# Patient Record
Sex: FEMALE | Race: BLACK OR AFRICAN AMERICAN | NOT HISPANIC OR LATINO
[De-identification: names, ages, dates, MRNs, and addresses within clinical notes are randomized per-mention and may not be internally consistent; named-entity substitution may affect disease eponyms.]

---

## 2019-03-05 ENCOUNTER — TRANSCRIPTION ENCOUNTER (OUTPATIENT)
Age: 24
End: 2019-03-05

## 2019-07-01 PROBLEM — Z00.00 ENCOUNTER FOR PREVENTIVE HEALTH EXAMINATION: Status: ACTIVE | Noted: 2019-07-01

## 2019-08-01 ENCOUNTER — APPOINTMENT (OUTPATIENT)
Dept: GASTROENTEROLOGY | Facility: CLINIC | Age: 24
End: 2019-08-01

## 2020-06-02 ENCOUNTER — EMERGENCY (EMERGENCY)
Facility: HOSPITAL | Age: 25
LOS: 1 days | Discharge: HOME | End: 2020-06-02
Attending: EMERGENCY MEDICINE
Payer: SELF-PAY

## 2020-06-02 VITALS
WEIGHT: 126.99 LBS | DIASTOLIC BLOOD PRESSURE: 66 MMHG | OXYGEN SATURATION: 97 % | TEMPERATURE: 99 F | RESPIRATION RATE: 20 BRPM | SYSTOLIC BLOOD PRESSURE: 95 MMHG | HEART RATE: 69 BPM | HEIGHT: 66 IN

## 2020-06-02 VITALS — DIASTOLIC BLOOD PRESSURE: 68 MMHG | SYSTOLIC BLOOD PRESSURE: 108 MMHG | HEART RATE: 72 BPM

## 2020-06-02 LAB — HCG UR QL: POSITIVE

## 2020-06-02 PROCEDURE — 72125 CT NECK SPINE W/O DYE: CPT | Mod: 26

## 2020-06-02 PROCEDURE — 99284 EMERGENCY DEPT VISIT MOD MDM: CPT

## 2020-06-02 PROCEDURE — 72125 CT NECK SPINE W/O DYE: CPT

## 2020-06-02 PROCEDURE — 81025 URINE PREGNANCY TEST: CPT

## 2020-06-02 RX ORDER — ACETAMINOPHEN 500 MG
650 TABLET ORAL ONCE
Refills: 0 | Status: COMPLETED | OUTPATIENT
Start: 2020-06-02 | End: 2020-06-02

## 2020-06-02 RX ADMIN — Medication 650 MILLIGRAM(S): at 15:44
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
## 2020-06-02 NOTE — ED STATDOCS - PATIENT PORTAL LINK FT
You can access the FollowMyHealth Patient Portal offered by [facility] by registering at the following website: [email or web address]. By joining [employer]’s FollowMyHealth portal, you will also be able to view your health information using other applications (apps) compatible with our system.
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
## 2020-06-02 NOTE — ED STATDOCS - NSFOLLOWUPCLINICS_GEN_ALL_ED_FT
[id number] [place] [address]  Phone: [phone]  Fax:   Follow Up Time: 1-3 Days
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
## 2020-06-02 NOTE — ED STATDOCS - PHYSICAL EXAMINATION
MSK: +C3 TTP, +  paraspinal neck and back TTP, +diffuse muscle aches, Full ROM all extremities, no bony TTP
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
## 2022-01-24 ENCOUNTER — APPOINTMENT (OUTPATIENT)
Dept: ANTEPARTUM | Facility: CLINIC | Age: 27
End: 2022-01-24

## 2022-01-31 ENCOUNTER — APPOINTMENT (OUTPATIENT)
Dept: ANTEPARTUM | Facility: CLINIC | Age: 27
End: 2022-01-31

## 2022-02-07 ENCOUNTER — APPOINTMENT (OUTPATIENT)
Dept: ANTEPARTUM | Facility: CLINIC | Age: 27
End: 2022-02-07
Payer: MEDICAID

## 2022-02-07 ENCOUNTER — ASOB RESULT (OUTPATIENT)
Age: 27
End: 2022-02-07

## 2022-02-07 PROCEDURE — 76801 OB US < 14 WKS SINGLE FETUS: CPT

## 2022-02-07 PROCEDURE — 76813 OB US NUCHAL MEAS 1 GEST: CPT

## 2022-04-13 ENCOUNTER — APPOINTMENT (OUTPATIENT)
Dept: ANTEPARTUM | Facility: CLINIC | Age: 27
End: 2022-04-13
Payer: MEDICAID

## 2022-04-13 ENCOUNTER — NON-APPOINTMENT (OUTPATIENT)
Age: 27
End: 2022-04-13

## 2022-04-13 ENCOUNTER — ASOB RESULT (OUTPATIENT)
Age: 27
End: 2022-04-13

## 2022-04-13 PROCEDURE — 76811 OB US DETAILED SNGL FETUS: CPT

## 2022-04-13 PROCEDURE — 76817 TRANSVAGINAL US OBSTETRIC: CPT

## 2022-06-22 ENCOUNTER — APPOINTMENT (OUTPATIENT)
Dept: ANTEPARTUM | Facility: CLINIC | Age: 27
End: 2022-06-22
Payer: COMMERCIAL

## 2022-06-22 ENCOUNTER — ASOB RESULT (OUTPATIENT)
Age: 27
End: 2022-06-22

## 2022-06-22 PROCEDURE — 76816 OB US FOLLOW-UP PER FETUS: CPT

## 2022-06-22 PROCEDURE — 76819 FETAL BIOPHYS PROFIL W/O NST: CPT

## 2022-07-06 ENCOUNTER — OUTPATIENT (OUTPATIENT)
Dept: INPATIENT UNIT | Facility: HOSPITAL | Age: 27
LOS: 1 days | End: 2022-07-06
Payer: SELF-PAY

## 2022-07-06 VITALS — HEART RATE: 95 BPM | DIASTOLIC BLOOD PRESSURE: 74 MMHG | SYSTOLIC BLOOD PRESSURE: 120 MMHG

## 2022-07-06 VITALS — DIASTOLIC BLOOD PRESSURE: 72 MMHG | HEART RATE: 85 BPM | SYSTOLIC BLOOD PRESSURE: 110 MMHG

## 2022-07-06 DIAGNOSIS — O47.03 FALSE LABOR BEFORE 37 COMPLETED WEEKS OF GESTATION, THIRD TRIMESTER: ICD-10-CM

## 2022-07-06 PROCEDURE — 59025 FETAL NON-STRESS TEST: CPT

## 2022-07-06 PROCEDURE — G0463: CPT

## 2022-07-06 NOTE — OB PROVIDER TRIAGE NOTE - HISTORY OF PRESENT ILLNESS
PADMINI JAIMES is a 27y  at 34w1d GA who presents to L&D for rule out ROM. She describes leaking of runny, clear, non-odorous fluid that started 3 days ago. It comes and goes but lasts throughout the day. At her most recent office visit, she described these symptoms and was tested for Candida/BV, but was negative. She denies vaginal bleeding, contractions. She endorses increasing pelvic pressure over the last 2 weeks. She endorses good fetal movement.   She denies headaches, fevers, chills, nausea, vomiting, dysuria.    Pregnancy course is uncomplicated.     POB: G1 (2018) medical TOP  PGYN: -fibroids/+cystic ovaries, denied STD hx, denies abnormal PAPs  PMH: Denies  PSH: R breast biopsy resulted benign  SH: Denies tobacco use, EtOH use and illicit drug use during the pregnancy; Feels safe at home  Meds: PNV  All: NKDA       PADMINI JAIMES is a 27y  at 34w1d GA who presents to L&D for rule out ROM. She describes leaking of runny, clear, non-odorous fluid that started 3 days ago. It comes and goes but lasts throughout the day. At her most recent office visit, she described these symptoms and was tested for Candida/BV, but was negative. She last had intercourse yesterday. She denies vaginal bleeding, contractions. She endorses increasing pelvic pressure over the last 2 weeks. She endorses good fetal movement.   She denies headaches, fevers, chills, nausea, vomiting, dysuria.    Pregnancy course is uncomplicated.     POB: G1 (2018) medical TOP  PGYN: -fibroids/+cystic ovaries, denied STD hx, denies abnormal PAPs  PMH: Denies  PSH: R breast biopsy resulted benign  SH: Denies tobacco use, EtOH use and illicit drug use during the pregnancy; Feels safe at home  Meds: PNV  All: NKDA

## 2022-07-06 NOTE — OB PROVIDER TRIAGE NOTE - NSHPPHYSICALEXAM_GEN_ALL_CORE
HR: 95 (07-06-22 @ 21:10) (95 - 95)  BP: 120/74 (07-06-22 @ 21:10) (120/74 - 120/74)    Gen: NAD, well-appearing  Heart: RRR  Lungs: CTAB  Abd: soft, gravid    SVE:   SSE: cervix visualized, closed and without any signs of bleeding or drainage, no pooling   FHT: 120/mod/+accels/-decels  [place]: none    Discussed with  HR: 95 (07-06-22 @ 21:10) (95 - 95)  BP: 120/74 (07-06-22 @ 21:10) (120/74 - 120/74)    Gen: NAD, well-appearing  Heart: RRR  Lungs: CTAB  Abd: soft, gravid    SVE: @  SSE: cervix visualized, closed and without any signs of bleeding or drainage, no pooling   FHT: 120/mod/+accels/-decels  [place]: none    Discussed with  HR: 95 (07-06-22 @ 21:10) (95 - 95)  BP: 120/74 (07-06-22 @ 21:10) (120/74 - 120/74)    Gen: NAD, well-appearing  Heart: RRR  Lungs: CTAB  Abd: soft, gravid    SVE: 0/0  SSE: cervix visualized, closed and without any signs of bleeding or drainage, no pooling of fluid, moderate leukorrhea  FHT: 120/mod/+accels/-decels  [place]: none  Bedside U/S: vtx, anterior. RUBEN 17.9

## 2022-08-08 ENCOUNTER — OUTPATIENT (OUTPATIENT)
Dept: INPATIENT UNIT | Facility: HOSPITAL | Age: 27
LOS: 1 days | End: 2022-08-08
Payer: SELF-PAY

## 2022-08-08 VITALS — SYSTOLIC BLOOD PRESSURE: 101 MMHG | DIASTOLIC BLOOD PRESSURE: 62 MMHG | HEART RATE: 78 BPM

## 2022-08-08 VITALS
SYSTOLIC BLOOD PRESSURE: 102 MMHG | RESPIRATION RATE: 18 BRPM | TEMPERATURE: 98 F | HEART RATE: 87 BPM | DIASTOLIC BLOOD PRESSURE: 65 MMHG

## 2022-08-08 DIAGNOSIS — O47.1 FALSE LABOR AT OR AFTER 37 COMPLETED WEEKS OF GESTATION: ICD-10-CM

## 2022-08-08 LAB
APPEARANCE UR: ABNORMAL
APTT BLD: 31.3 SEC — SIGNIFICANT CHANGE UP (ref 27.5–35.5)
BACTERIA # UR AUTO: ABNORMAL
BASOPHILS # BLD AUTO: 0.02 K/UL — SIGNIFICANT CHANGE UP (ref 0–0.2)
BASOPHILS NFR BLD AUTO: 0.3 % — SIGNIFICANT CHANGE UP (ref 0–2)
BILIRUB UR-MCNC: NEGATIVE — SIGNIFICANT CHANGE UP
BLD GP AB SCN SERPL QL: SIGNIFICANT CHANGE UP
COLOR SPEC: YELLOW — SIGNIFICANT CHANGE UP
DIFF PNL FLD: ABNORMAL
DIR ANTIGLOB POLYSPECIFIC INTERPRETATION: SIGNIFICANT CHANGE UP
EOSINOPHIL # BLD AUTO: 0.02 K/UL — SIGNIFICANT CHANGE UP (ref 0–0.5)
EOSINOPHIL NFR BLD AUTO: 0.3 % — SIGNIFICANT CHANGE UP (ref 0–6)
EPI CELLS # UR: ABNORMAL
FIBRINOGEN PPP-MCNC: 651 MG/DL — HIGH (ref 330–520)
GLUCOSE UR QL: NEGATIVE MG/DL — SIGNIFICANT CHANGE UP
HCT VFR BLD CALC: 30.1 % — LOW (ref 34.5–45)
HGB BLD-MCNC: 10.1 G/DL — LOW (ref 11.5–15.5)
IMM GRANULOCYTES NFR BLD AUTO: 1.2 % — SIGNIFICANT CHANGE UP (ref 0–1.5)
INR BLD: 1.07 RATIO — SIGNIFICANT CHANGE UP (ref 0.88–1.16)
KETONES UR-MCNC: ABNORMAL
LEUKOCYTE ESTERASE UR-ACNC: ABNORMAL
LYMPHOCYTES # BLD AUTO: 1.38 K/UL — SIGNIFICANT CHANGE UP (ref 1–3.3)
LYMPHOCYTES # BLD AUTO: 20 % — SIGNIFICANT CHANGE UP (ref 13–44)
MCHC RBC-ENTMCNC: 28.9 PG — SIGNIFICANT CHANGE UP (ref 27–34)
MCHC RBC-ENTMCNC: 33.6 GM/DL — SIGNIFICANT CHANGE UP (ref 32–36)
MCV RBC AUTO: 86.2 FL — SIGNIFICANT CHANGE UP (ref 80–100)
MONOCYTES # BLD AUTO: 0.57 K/UL — SIGNIFICANT CHANGE UP (ref 0–0.9)
MONOCYTES NFR BLD AUTO: 8.3 % — SIGNIFICANT CHANGE UP (ref 2–14)
NEUTROPHILS # BLD AUTO: 4.83 K/UL — SIGNIFICANT CHANGE UP (ref 1.8–7.4)
NEUTROPHILS NFR BLD AUTO: 69.9 % — SIGNIFICANT CHANGE UP (ref 43–77)
NITRITE UR-MCNC: NEGATIVE — SIGNIFICANT CHANGE UP
PH UR: 7 — SIGNIFICANT CHANGE UP (ref 5–8)
PLATELET # BLD AUTO: 202 K/UL — SIGNIFICANT CHANGE UP (ref 150–400)
PROT UR-MCNC: NEGATIVE — SIGNIFICANT CHANGE UP
PROTHROM AB SERPL-ACNC: 12.4 SEC — SIGNIFICANT CHANGE UP (ref 10.5–13.4)
RBC # BLD: 3.49 M/UL — LOW (ref 3.8–5.2)
RBC # FLD: 18.3 % — HIGH (ref 10.3–14.5)
RBC CASTS # UR COMP ASSIST: ABNORMAL /HPF (ref 0–4)
SP GR SPEC: 1.01 — SIGNIFICANT CHANGE UP (ref 1.01–1.02)
UROBILINOGEN FLD QL: NEGATIVE MG/DL — SIGNIFICANT CHANGE UP
WBC # BLD: 6.9 K/UL — SIGNIFICANT CHANGE UP (ref 3.8–10.5)
WBC # FLD AUTO: 6.9 K/UL — SIGNIFICANT CHANGE UP (ref 3.8–10.5)
WBC UR QL: ABNORMAL /HPF (ref 0–5)

## 2022-08-08 PROCEDURE — 99212 OFFICE O/P EST SF 10 MIN: CPT

## 2022-08-08 PROCEDURE — 86077 PHYS BLOOD BANK SERV XMATCH: CPT

## 2022-08-08 RX ORDER — ACETAMINOPHEN 500 MG
975 TABLET ORAL ONCE
Refills: 0 | Status: COMPLETED | OUTPATIENT
Start: 2022-08-08 | End: 2022-08-08

## 2022-08-08 RX ADMIN — Medication 975 MILLIGRAM(S): at 14:24

## 2022-08-08 RX ADMIN — Medication 975 MILLIGRAM(S): at 14:20

## 2022-08-08 NOTE — OB PROVIDER TRIAGE NOTE - HISTORY OF PRESENT ILLNESS
PADMINI JAIMES is a 27y  at 38.6 weeks GA who presents to L&D after a fall. The patient states that yesterday she was walking her boyfriend's dog and was pulled. She subsequently fell onto the grass landing on her left flank/back. Starting last night after the fall, the patient noted sharp pelvic pain that radiates to the right side of her lower abdomen and is worse with movement but gets better when she lies still. She has not taken any medication at home. When she lies still the pain is a 4/10 however with movement, the pain is a 10/10. Pt denies vaginal bleeding, contractions and leakage of fluid. She endorses good fetal movement.     Pregnancy course is significant for:  Anemia, managed with iron infusions, the last one being last week     POB: TOPx1 2017, G2: current   PGYN: -fibroids/-cysts, denied STD hx, denies abnormal PAPs  PMH: cystic breast masses  PSH: Removal of a right breast cyst  SH: Denies tobacco use, EtOH use and illicit drug use during the pregnancy; Feels safe at home  Meds: iron infusions. PNV  All: NKDA

## 2022-08-08 NOTE — OB PROVIDER TRIAGE NOTE - ATTENDING COMMENTS
27y  at 38.6 weeks GA who presents to L&D after a fall. The patient states that yesterday she was walking her boyfriend's dog and was pulled. She subsequently fell onto the grass landing on her left flank/back. This occurred ~10pm last night. Since then has had pelvic/groin pain  VSS  FHT - reactive  TOco - irregular contractions  SVE - closed long.  SSE - no pooling  sono BPP / RUBEN 6.98  26 y/o  @ 38+6 s/p fall on her back/side last night - with reassuring maternal and fetal status   -  s/p tylenol with some improvement in pain   - pt has f/u appt at the end of the week  - labor/precautions discussed  - plan d/c home and f/u as scheduled    Autumn Church MD

## 2022-08-08 NOTE — OB RN TRIAGE NOTE - FALL HARM RISK - UNIVERSAL INTERVENTIONS
Bed in lowest position, wheels locked, appropriate side rails in place/Call bell, personal items and telephone in reach/Instruct patient to call for assistance before getting out of bed or chair/Non-slip footwear when patient is out of bed/[place] to call system/Physically safe environment - no spills, clutter or unnecessary equipment/Purposeful Proactive Rounding/Room/bathroom lighting operational, light cord in reach

## 2022-08-08 NOTE — OB PROVIDER TRIAGE NOTE - NSOBPROVIDERNOTE_OBGYN_ALL_OB_FT
A/P: PADMINI JAIMES is a 27y  at 38.6 weeks GA assessed for r/o labor, r/o abruption     - pain management: 975mg acetaminophen   - oral hydration   - f/u UA  - f/u CBC, type and screen, PT/PTT, fibrinogen     Fetus: Reactive tracing   [place]: q 10 min   Dispo: Continue to observe.     Discussed with Dr. Church A/P: PADMINI JAIMES is a 27y  at 38.6 weeks GA assessed for r/o labor, r/o abruption     - pain management: 975mg acetaminophen   - oral hydration   - f/u UA  - f/u CBC, type and screen, PT/PTT, fibrinogen   - f/u BPP    Fetus: Reactive tracing   [place]: q 10 min   Dispo: Continue to observe.     Discussed with Dr. Church A/P: PADMINI JAIMES is a 27y  at 38.6 weeks GA assessed for r/o labor, r/o abruption     - pain management: 975mg acetaminophen   - oral hydration   - f/u UA  - f/u CBC, type and screen, PT/PTT, fibrinogen   - f/u BPP      BPP:  RUBEN: 5.12  Patient has a follow up appointment at [facility] on     Fetus: Reactive tracing   [place]: q 10 min   Dispo: Continue to observe.     Discussed with Dr. Church A/P: PADMINI JAIMES is a 27y  at 38.6 weeks GA assessed for r/o labor, r/o abruption     - pain management: 975mg acetaminophen   - oral hydration   - f/u UA  - f/u CBC, type and screen, PT/PTT, fibrinogen   - f/u BPP      BPP:  RUBEN: 6.98  - Nitrazine negative   - Will f/u AFFIRM and GC/CT  Patient has a follow up appointment at [facility] on   - patient was counselled on returning to the hospital should she experience decreased fetal movement, leakage of fluid, vaginal bleeding or decreased fetal movement   Fetus: Reactive tracing with prolonged monitoring for 2 hours   [place]: q 10 min   Dispo: Continue to home.     Discussed with Dr. Church

## 2022-08-08 NOTE — OB PROVIDER TRIAGE NOTE - NSHPPHYSICALEXAM_GEN_ALL_CORE
T(C): 36.6 (08-08-22 @ 14:10), Max: 36.6 (08-08-22 @ 13:58)  HR: 87 (08-08-22 @ 14:10) (87 - 87)  BP: 102/65 (08-08-22 @ 14:10) (102/65 - 102/65)  RR: 18 (08-08-22 @ 14:10) (18 - 18)    Gen: NAD, well-appearing, resting comfortably on room air   Abd: soft, gravid, tender to palpation above the pubic symphysis and at RLQ  Ext: non-edematous, non-tender   SVE:   FHT: baseline 120, moderate variability, +accels, -decels   [place]: q 10 min T(C): 36.6 (08-08-22 @ 14:10), Max: 36.6 (08-08-22 @ 13:58)  HR: 87 (08-08-22 @ 14:10) (87 - 87)  BP: 102/65 (08-08-22 @ 14:10) (102/65 - 102/65)  RR: 18 (08-08-22 @ 14:10) (18 - 18)    Gen: NAD, well-appearing, resting comfortably on room air   Abd: soft, gravid, tender to palpation above the pubic symphysis and at RLQ  Ext: non-edematous, non-tender   SVE: closed, posterior   FHT: baseline 120, moderate variability, +accels, -decels   [place]: q 10 min T(C): 36.6 (08-08-22 @ 14:10), Max: 36.6 (08-08-22 @ 13:58)  HR: 87 (08-08-22 @ 14:10) (87 - 87)  BP: 102/65 (08-08-22 @ 14:10) (102/65 - 102/65)  RR: 18 (08-08-22 @ 14:10) (18 - 18)    Gen: NAD, well-appearing, resting comfortably on room air   Abd: soft, gravid, tender to palpation above the pubic symphysis and at RLQ  Ext: non-edematous, non-tender   SVE: closed, posterior   SSE: no gross pooling or blood in vaginal canal. Cervix visualized and closed. thin white discharge visualized   FHT: baseline 120, moderate variability, +accels, -decels   [place]: q 10 min

## 2022-08-08 NOTE — OB PROVIDER TRIAGE NOTE - NSHPLABSRESULTS_GEN_ALL_CORE
Fibrinogen Assay: 651  Activated Partial Thromboplastin Time: 31.3  Prothrombin Time, Plasma: 12.4  INR: 1.07  Hemoglobin: 10.1Urinalysis (08.08.22 @ 15:00)     Urinalysis (08.08.22 @ 15:00)   pH Urine: 7.0   Glucose Qualitative, Urine: Negative mg/dL   Blood, Urine: Trace   Color: Yellow   Urine Appearance: Slightly Turbid   Bilirubin: Negative   Ketone - Urine: Large   Specific Gravity: 1.010   Protein, Urine: Negative   Urobilinogen: Negative mg/dL   Nitrite: Negative   Leukocyte Esterase Concentration: Moderate

## 2022-08-09 LAB
C TRACH RRNA SPEC QL NAA+PROBE: SIGNIFICANT CHANGE UP
CANDIDA AB TITR SER: DETECTED
G VAGINALIS DNA SPEC QL NAA+PROBE: SIGNIFICANT CHANGE UP
N GONORRHOEA RRNA SPEC QL NAA+PROBE: SIGNIFICANT CHANGE UP
SPECIMEN SOURCE: SIGNIFICANT CHANGE UP
T VAGINALIS SPEC QL WET PREP: SIGNIFICANT CHANGE UP

## 2022-08-22 RX ORDER — OXYTOCIN 10 UNIT/ML
333.33 VIAL (ML) INJECTION
Qty: 20 | Refills: 0 | Status: COMPLETED | OUTPATIENT
Start: 2022-08-23 | End: 2022-08-23

## 2022-08-22 RX ORDER — CITRIC ACID/SODIUM CITRATE 300-500 MG
30 SOLUTION, ORAL ORAL ONCE
Refills: 0 | Status: DISCONTINUED | OUTPATIENT
Start: 2022-08-23 | End: 2022-08-25

## 2022-08-22 RX ORDER — CHLORHEXIDINE GLUCONATE 213 G/1000ML
1 SOLUTION TOPICAL ONCE
Refills: 0 | Status: DISCONTINUED | OUTPATIENT
Start: 2022-08-23 | End: 2022-08-25

## 2022-08-22 RX ORDER — SODIUM CHLORIDE 9 MG/ML
1000 INJECTION, SOLUTION INTRAVENOUS
Refills: 0 | Status: DISCONTINUED | OUTPATIENT
Start: 2022-08-23 | End: 2022-08-25

## 2022-08-22 NOTE — OB PROVIDER H&P - ASSESSMENT
Crystal Florez Neal is a 27 year old  at 41w by LMP who presents to L&D for postdates IOL  -Admit to L&D: Consent and admission labs  -Ordered Utox  -IV fluids  -Labor: Intact/*ROM. Latent/Active labor. [place] *.   -Fetus: Cat I tracing. Continuous toco and fetal monitoring.   -GBS: Negative, no GBS ppx required   -Analgesia:  Crystal Florez Neal is a 27 year old  at 41w by LMP who presents to L&D for postdates IOL  -Admit to L&D: Consent and admission labs  -Ordered Utox  -IV fluids  -Fetus: Cat I tracing. Continuous toco and fetal monitoring.   -GBS: Negative, no GBS ppx required   -Analgesia: prn

## 2022-08-22 NOTE — OB PROVIDER H&P - HISTORY OF PRESENT ILLNESS
Crystal Florez is a 27 year old  at 41w by LMP who presents to L&D for postdates IOL.      RICHMOND: 22   LMP: 21      Pregnancy course:   Bipolar disorder on no medication   Anemia s/p IV iron   Rh negative s/p Rhogam    Marijuana use in first trimester, since stopped      Obhx: G1 () med TOP   Gynhx: Denies abnormal Paps, STIs   Pmhx: Bipolar disorder, benign R breast cyst   Pshx: Denies   Meds: PNV, iron   Allergies: NKDA   Social Hx: Marijuana use in first trimester, since stopped. Denies alcohol, tobacco use during pregnancy.      BMI: 21.63   Ultrasound: vtx, ant   EFW: 1959, 46%ile   Crystal Florez is a 27 year old  at 41w by LMP who presents to L&D for postdates IOL.      RICHMOND: 22   LMP: 21      Pregnancy course:   Bipolar disorder on no medication   Anemia s/p IV iron   Rh negative s/p Rhogam    Marijuana use in first trimester, since stopped      Obhx: G1 () med TOP   Gynhx: Denies abnormal Paps, STIs   Pmhx: Bipolar disorder, benign R breast cyst   Pshx: Denies   Meds: PNV, iron infusions (2-3 weeks ago)  Allergies: Latex (burning with contact)  Social Hx: Marijuana use in first trimester, since stopped. Denies alcohol, tobacco use during pregnancy.      BMI: 21.63   Ultrasound: vtx, ant   EFW: , 46%ile      27y G_P_ at _ weeks GA by LMP consistent with _ trimester sono who presents to L&D for _. Patient denies vaginal bleeding, contractions and leakage of fluid. She endorses good fetal movement. Denies fevers, chills, nausea, vomiting, chest pain, SOB, dizziness and headache. No other complaints at this time.   [place]: _  LMP: _  Prenatal course is significant for:  Prenatal course uncomplicated.      POB:  PGYN: -fibroids, -ovarian cysts, denies STD hx, denies abnormal PAPs   PMH: Denies  PSH: Denies  SH: Denies EtOH, tobacco and illicit drug use during this pregnancy; feels safe at home   Meds: PNVs  Allergies: NKDA    BMI:  Sono:  EFW:         A/P:   -Admit to L&D  -Consent  -Admission labs  -NPO, except ice chips   -IV fluids  -Labor: Intact/*ROM. Latent/Active labor. [place] *.   -Fetus: Cat I tracing. Continuous toco and fetal monitoring.   -GBS: Negative, no GBS ppx required   -Analgesia:     Discussed with  _ Crystal Florez is a 27 year old  at 41w by LMP who presents to L&D for postdates IOL.      RICHMOND: 22   LMP: 21      Pregnancy course:   Bipolar disorder on no medication   Anemia s/p IV iron   Rh negative s/p Rhogam    Marijuana use in first trimester, since stopped      Obhx: G1 () med TOP   Gynhx: Denies abnormal Paps, STIs   Pmhx: Bipolar disorder, benign R breast cyst   Pshx: Denies   Meds: PNV, iron infusions (2-3 weeks ago)  Allergies: Latex (burning with contact)  Social Hx: Marijuana use in first trimester, since stopped. Denies alcohol, tobacco use during pregnancy.      BMI: 21.63   Ultrasound: vtx, ant   EFW: 195, 46%ile

## 2022-08-22 NOTE — OB PROVIDER H&P - NSHPPHYSICALEXAM_GEN_ALL_CORE
T(C): 37.1 (08-23-22 @ 09:47), Max: 37.1 (08-23-22 @ 09:42)  HR: 110 (08-23-22 @ 09:47) (110 - 110)  BP: 109/67 (08-23-22 @ 09:47) (109/67 - 109/67)  RR: 18 (08-23-22 @ 09:47) (18 - 18)      Gen: NAD, well-appearing, AAOx3   Abd: Soft, gravid  Ext: non-tender, non-edematous  SVE:    Bedside sono: vertex, anterior  FHT: baseline 120, moderate variability, +accels, -decels   [place]: irregular contractions T(C): 37.1 (08-23-22 @ 09:47), Max: 37.1 (08-23-22 @ 09:42)  HR: 110 (08-23-22 @ 09:47) (110 - 110)  BP: 109/67 (08-23-22 @ 09:47) (109/67 - 109/67)  RR: 18 (08-23-22 @ 09:47) (18 - 18)      Gen: NAD, well-appearing, AAOx3   Abd: Soft, gravid  Ext: non-tender, non-edematous  SVE:  .5/40/-3  Bedside sono: vertex, anterior  FHT: baseline 120, moderate variability, +accels, -decels   [place]: irregular contractions

## 2022-08-23 ENCOUNTER — INPATIENT (INPATIENT)
Facility: HOSPITAL | Age: 27
LOS: 2 days | Discharge: HOME | End: 2022-08-26
Attending: OBSTETRICS & GYNECOLOGY | Admitting: OBSTETRICS & GYNECOLOGY
Payer: COMMERCIAL

## 2022-08-23 VITALS — HEART RATE: 110 BPM | SYSTOLIC BLOOD PRESSURE: 109 MMHG | DIASTOLIC BLOOD PRESSURE: 67 MMHG

## 2022-08-23 DIAGNOSIS — O48.0 POST-TERM PREGNANCY: ICD-10-CM

## 2022-08-23 LAB
AMPHET UR-MCNC: NEGATIVE — SIGNIFICANT CHANGE UP
BARBITURATES UR SCN-MCNC: NEGATIVE — SIGNIFICANT CHANGE UP
BASOPHILS # BLD AUTO: 0.01 K/UL — SIGNIFICANT CHANGE UP (ref 0–0.2)
BASOPHILS NFR BLD AUTO: 0.2 % — SIGNIFICANT CHANGE UP (ref 0–2)
BENZODIAZ UR-MCNC: NEGATIVE — SIGNIFICANT CHANGE UP
BLD GP AB SCN SERPL QL: SIGNIFICANT CHANGE UP
COCAINE METAB.OTHER UR-MCNC: NEGATIVE — SIGNIFICANT CHANGE UP
COVID-19 SPIKE DOMAIN AB INTERP: NEGATIVE — SIGNIFICANT CHANGE UP
COVID-19 SPIKE DOMAIN ANTIBODY RESULT: 0.5 U/ML — SIGNIFICANT CHANGE UP
EOSINOPHIL # BLD AUTO: 0.04 K/UL — SIGNIFICANT CHANGE UP (ref 0–0.5)
EOSINOPHIL NFR BLD AUTO: 0.6 % — SIGNIFICANT CHANGE UP (ref 0–6)
HCT VFR BLD CALC: 31.4 % — LOW (ref 34.5–45)
HGB BLD-MCNC: 10.3 G/DL — LOW (ref 11.5–15.5)
IMM GRANULOCYTES NFR BLD AUTO: 1.4 % — SIGNIFICANT CHANGE UP (ref 0–1.5)
LYMPHOCYTES # BLD AUTO: 1.11 K/UL — SIGNIFICANT CHANGE UP (ref 1–3.3)
LYMPHOCYTES # BLD AUTO: 17.2 % — SIGNIFICANT CHANGE UP (ref 13–44)
MCHC RBC-ENTMCNC: 29 PG — SIGNIFICANT CHANGE UP (ref 27–34)
MCHC RBC-ENTMCNC: 32.8 GM/DL — SIGNIFICANT CHANGE UP (ref 32–36)
MCV RBC AUTO: 88.5 FL — SIGNIFICANT CHANGE UP (ref 80–100)
METHADONE UR-MCNC: NEGATIVE — SIGNIFICANT CHANGE UP
MONOCYTES # BLD AUTO: 0.34 K/UL — SIGNIFICANT CHANGE UP (ref 0–0.9)
MONOCYTES NFR BLD AUTO: 5.3 % — SIGNIFICANT CHANGE UP (ref 2–14)
NEUTROPHILS # BLD AUTO: 4.86 K/UL — SIGNIFICANT CHANGE UP (ref 1.8–7.4)
NEUTROPHILS NFR BLD AUTO: 75.3 % — SIGNIFICANT CHANGE UP (ref 43–77)
OPIATES UR-MCNC: NEGATIVE — SIGNIFICANT CHANGE UP
PCP SPEC-MCNC: SIGNIFICANT CHANGE UP
PCP UR-MCNC: NEGATIVE — SIGNIFICANT CHANGE UP
PLATELET # BLD AUTO: 226 K/UL — SIGNIFICANT CHANGE UP (ref 150–400)
RBC # BLD: 3.55 M/UL — LOW (ref 3.8–5.2)
RBC # FLD: 17.8 % — HIGH (ref 10.3–14.5)
SARS-COV-2 IGG+IGM SERPL QL IA: 0.5 U/ML — SIGNIFICANT CHANGE UP
SARS-COV-2 IGG+IGM SERPL QL IA: NEGATIVE — SIGNIFICANT CHANGE UP
T PALLIDUM AB TITR SER: NEGATIVE — SIGNIFICANT CHANGE UP
THC UR QL: POSITIVE
WBC # BLD: 6.45 K/UL — SIGNIFICANT CHANGE UP (ref 3.8–10.5)
WBC # FLD AUTO: 6.45 K/UL — SIGNIFICANT CHANGE UP (ref 3.8–10.5)

## 2022-08-23 RX ORDER — BUTORPHANOL TARTRATE 2 MG/ML
2 INJECTION, SOLUTION INTRAMUSCULAR; INTRAVENOUS ONCE
Refills: 0 | Status: DISCONTINUED | OUTPATIENT
Start: 2022-08-23 | End: 2022-08-23

## 2022-08-23 RX ORDER — OXYTOCIN 10 UNIT/ML
VIAL (ML) INJECTION
Qty: 30 | Refills: 0 | Status: DISCONTINUED | OUTPATIENT
Start: 2022-08-23 | End: 2022-08-24

## 2022-08-23 RX ADMIN — SODIUM CHLORIDE 125 MILLILITER(S): 9 INJECTION, SOLUTION INTRAVENOUS at 16:56

## 2022-08-23 RX ADMIN — SODIUM CHLORIDE 125 MILLILITER(S): 9 INJECTION, SOLUTION INTRAVENOUS at 11:39

## 2022-08-23 RX ADMIN — BUTORPHANOL TARTRATE 2 MILLIGRAM(S): 2 INJECTION, SOLUTION INTRAMUSCULAR; INTRAVENOUS at 23:09

## 2022-08-23 NOTE — OB RN PATIENT PROFILE - FUNCTIONAL ASSESSMENT - BASIC MOBILITY 6.
4-calculated by average/Not able to assess (calculate score using [facility] averaging method)

## 2022-08-23 NOTE — OB PROVIDER LABOR PROGRESS NOTE - ASSESSMENT
-VSS  -Will consider augmentation with Pitocin after half an hour of monitoring her tracing   -Will re-assess as needed

## 2022-08-23 NOTE — CHART NOTE - NSCHARTNOTEFT_GEN_A_CORE
Urine toxocology was done given patient's history of marijuana use in the 1st trimester  Utox was positive for THC  Patient was made aware of the results   Will place a social work consult

## 2022-08-23 NOTE — OB RN PATIENT PROFILE - FALL HARM RISK - RISK INTERVENTIONS

## 2022-08-24 ENCOUNTER — TRANSCRIPTION ENCOUNTER (OUTPATIENT)
Age: 27
End: 2022-08-24

## 2022-08-24 DIAGNOSIS — N60.01 SOLITARY CYST OF RIGHT BREAST: Chronic | ICD-10-CM

## 2022-08-24 LAB
MEV IGG SER-ACNC: 63.8 AU/ML — SIGNIFICANT CHANGE UP
MEV IGG+IGM SER-IMP: POSITIVE — SIGNIFICANT CHANGE UP

## 2022-08-24 RX ORDER — SODIUM CHLORIDE 9 MG/ML
1000 INJECTION, SOLUTION INTRAVENOUS ONCE
Refills: 0 | Status: DISCONTINUED | OUTPATIENT
Start: 2022-08-24 | End: 2022-08-24

## 2022-08-24 RX ORDER — SIMETHICONE 80 MG/1
80 TABLET, CHEWABLE ORAL EVERY 4 HOURS
Refills: 0 | Status: DISCONTINUED | OUTPATIENT
Start: 2022-08-24 | End: 2022-08-26

## 2022-08-24 RX ORDER — FERROUS SULFATE 325(65) MG
325 TABLET ORAL DAILY
Refills: 0 | Status: DISCONTINUED | OUTPATIENT
Start: 2022-08-24 | End: 2022-08-26

## 2022-08-24 RX ORDER — IBUPROFEN 200 MG
600 TABLET ORAL EVERY 6 HOURS
Refills: 0 | Status: COMPLETED | OUTPATIENT
Start: 2022-08-24 | End: 2023-07-23

## 2022-08-24 RX ORDER — LANOLIN
1 OINTMENT (GRAM) TOPICAL EVERY 6 HOURS
Refills: 0 | Status: DISCONTINUED | OUTPATIENT
Start: 2022-08-24 | End: 2022-08-26

## 2022-08-24 RX ORDER — AER TRAVELER 0.5 G/1
1 SOLUTION RECTAL; TOPICAL EVERY 4 HOURS
Refills: 0 | Status: DISCONTINUED | OUTPATIENT
Start: 2022-08-24 | End: 2022-08-26

## 2022-08-24 RX ORDER — ONDANSETRON 8 MG/1
4 TABLET, FILM COATED ORAL ONCE
Refills: 0 | Status: COMPLETED | OUTPATIENT
Start: 2022-08-24 | End: 2022-08-24

## 2022-08-24 RX ORDER — DIBUCAINE 1 %
1 OINTMENT (GRAM) RECTAL EVERY 6 HOURS
Refills: 0 | Status: DISCONTINUED | OUTPATIENT
Start: 2022-08-24 | End: 2022-08-26

## 2022-08-24 RX ORDER — SODIUM CHLORIDE 9 MG/ML
3 INJECTION INTRAMUSCULAR; INTRAVENOUS; SUBCUTANEOUS EVERY 8 HOURS
Refills: 0 | Status: DISCONTINUED | OUTPATIENT
Start: 2022-08-24 | End: 2022-08-26

## 2022-08-24 RX ORDER — IBUPROFEN 200 MG
1 TABLET ORAL
Qty: 28 | Refills: 0
Start: 2022-08-24 | End: 2022-08-30

## 2022-08-24 RX ORDER — KETOROLAC TROMETHAMINE 30 MG/ML
30 SYRINGE (ML) INJECTION ONCE
Refills: 0 | Status: DISCONTINUED | OUTPATIENT
Start: 2022-08-24 | End: 2022-08-24

## 2022-08-24 RX ORDER — BENZOCAINE 10 %
1 GEL (GRAM) MUCOUS MEMBRANE EVERY 6 HOURS
Refills: 0 | Status: DISCONTINUED | OUTPATIENT
Start: 2022-08-24 | End: 2022-08-26

## 2022-08-24 RX ORDER — POLYETHYLENE GLYCOL 3350 17 G/17G
17 POWDER, FOR SOLUTION ORAL
Qty: 119 | Refills: 0
Start: 2022-08-24 | End: 2022-08-30

## 2022-08-24 RX ORDER — TETANUS TOXOID, REDUCED DIPHTHERIA TOXOID AND ACELLULAR PERTUSSIS VACCINE, ADSORBED 5; 2.5; 8; 8; 2.5 [IU]/.5ML; [IU]/.5ML; UG/.5ML; UG/.5ML; UG/.5ML
0.5 SUSPENSION INTRAMUSCULAR ONCE
Refills: 0 | Status: DISCONTINUED | OUTPATIENT
Start: 2022-08-24 | End: 2022-08-26

## 2022-08-24 RX ORDER — FERROUS SULFATE 325(65) MG
1 TABLET ORAL
Qty: 30 | Refills: 0
Start: 2022-08-24 | End: 2022-09-22

## 2022-08-24 RX ORDER — OXYTOCIN 10 UNIT/ML
VIAL (ML) INJECTION
Qty: 30 | Refills: 0 | Status: DISCONTINUED | OUTPATIENT
Start: 2022-08-24 | End: 2022-08-26

## 2022-08-24 RX ORDER — OXYCODONE HYDROCHLORIDE 5 MG/1
5 TABLET ORAL ONCE
Refills: 0 | Status: DISCONTINUED | OUTPATIENT
Start: 2022-08-24 | End: 2022-08-26

## 2022-08-24 RX ORDER — SODIUM CHLORIDE 9 MG/ML
1000 INJECTION, SOLUTION INTRAVENOUS
Refills: 0 | Status: DISCONTINUED | OUTPATIENT
Start: 2022-08-24 | End: 2022-08-26

## 2022-08-24 RX ORDER — OXYTOCIN 10 UNIT/ML
333.33 VIAL (ML) INJECTION
Qty: 20 | Refills: 0 | Status: DISCONTINUED | OUTPATIENT
Start: 2022-08-24 | End: 2022-08-26

## 2022-08-24 RX ORDER — ACETAMINOPHEN 500 MG
3 TABLET ORAL
Qty: 48 | Refills: 0
Start: 2022-08-24 | End: 2022-08-27

## 2022-08-24 RX ORDER — ACETAMINOPHEN 500 MG
975 TABLET ORAL
Refills: 0 | Status: DISCONTINUED | OUTPATIENT
Start: 2022-08-24 | End: 2022-08-26

## 2022-08-24 RX ORDER — SODIUM CHLORIDE 9 MG/ML
1000 INJECTION, SOLUTION INTRAVENOUS ONCE
Refills: 0 | Status: COMPLETED | OUTPATIENT
Start: 2022-08-24 | End: 2022-08-24

## 2022-08-24 RX ORDER — MAGNESIUM HYDROXIDE 400 MG/1
30 TABLET, CHEWABLE ORAL
Refills: 0 | Status: DISCONTINUED | OUTPATIENT
Start: 2022-08-24 | End: 2022-08-26

## 2022-08-24 RX ORDER — HYDROCORTISONE 1 %
1 OINTMENT (GRAM) TOPICAL EVERY 6 HOURS
Refills: 0 | Status: DISCONTINUED | OUTPATIENT
Start: 2022-08-24 | End: 2022-08-26

## 2022-08-24 RX ORDER — SODIUM CHLORIDE 9 MG/ML
500 INJECTION, SOLUTION INTRAVENOUS ONCE
Refills: 0 | Status: COMPLETED | OUTPATIENT
Start: 2022-08-24 | End: 2022-08-24

## 2022-08-24 RX ORDER — DIPHENHYDRAMINE HCL 50 MG
25 CAPSULE ORAL EVERY 6 HOURS
Refills: 0 | Status: DISCONTINUED | OUTPATIENT
Start: 2022-08-24 | End: 2022-08-26

## 2022-08-24 RX ORDER — OXYCODONE HYDROCHLORIDE 5 MG/1
5 TABLET ORAL
Refills: 0 | Status: DISCONTINUED | OUTPATIENT
Start: 2022-08-24 | End: 2022-08-26

## 2022-08-24 RX ORDER — PRAMOXINE HYDROCHLORIDE 150 MG/15G
1 AEROSOL, FOAM RECTAL EVERY 4 HOURS
Refills: 0 | Status: DISCONTINUED | OUTPATIENT
Start: 2022-08-24 | End: 2022-08-26

## 2022-08-24 RX ADMIN — Medication 0.2 MILLIGRAM(S): at 22:49

## 2022-08-24 RX ADMIN — SODIUM CHLORIDE 500 MILLILITER(S): 9 INJECTION, SOLUTION INTRAVENOUS at 12:12

## 2022-08-24 RX ADMIN — SODIUM CHLORIDE 125 MILLILITER(S): 9 INJECTION, SOLUTION INTRAVENOUS at 05:41

## 2022-08-24 RX ADMIN — SODIUM CHLORIDE 1000 MILLILITER(S): 9 INJECTION, SOLUTION INTRAVENOUS at 11:36

## 2022-08-24 RX ADMIN — Medication 30 MILLIGRAM(S): at 22:54

## 2022-08-24 RX ADMIN — SODIUM CHLORIDE 125 MILLILITER(S): 9 INJECTION, SOLUTION INTRAVENOUS at 13:06

## 2022-08-24 RX ADMIN — Medication 1000 MILLIUNIT(S)/MIN: at 22:44

## 2022-08-24 RX ADMIN — ONDANSETRON 4 MILLIGRAM(S): 8 TABLET, FILM COATED ORAL at 23:52

## 2022-08-24 RX ADMIN — Medication 2 MILLIUNIT(S)/MIN: at 01:01

## 2022-08-24 RX ADMIN — Medication 2 MILLIUNIT(S)/MIN: at 14:21

## 2022-08-24 NOTE — OB PROVIDER LABOR PROGRESS NOTE - ASSESSMENT
Plan:  - VSS  - Reactive tracing  - Will consult anesthesiology for epidural placement  - C/w pitocin augmentation  - Continuous FHT/[place]  - Maternal/fetal status reassuring    Plan d/w Dr. Perez

## 2022-08-24 NOTE — OB PROVIDER DELIVERY SUMMARY - NSSELHIDDEN_OBGYN_ALL_OB_FT
[[email or web address]_ALL_OB_FT:[id number]],[NS_DeliveryRN_OBGYN_ALL_OB_FT:[id number]],[NS_DeliveryAttending1_OBGYN_ALL_OB_FT:[id number]]

## 2022-08-24 NOTE — DISCHARGE NOTE OB - NS MD DC FALL RISK RISK
For information on Fall & Injury Prevention, visit: [email or web address] OR  [email or web address] OR  https://www.cdc.gov/steadi/patient.html

## 2022-08-24 NOTE — OB PROVIDER LABOR PROGRESS NOTE - NS_SUBJECTIVE/OBJECTIVE_OBGYN_ALL_OB_FT
Patient examined at bedside.  Reporting intermittnet pelvic pressure w/ contractions
Pt examined 2/2 pelvic pressure and pain.
Pt examined 2/2 pressure
Patient feels well
Patient reporting worsening discomfort w/ contractions
Pt examined 2/2 recurrent variable decels
Patient feels well
Patient re-examined at bedside. Resting comfortably.

## 2022-08-24 NOTE — DISCHARGE NOTE OB - CARE PLAN
Principal Discharge DX:	Normal spontaneous vaginal delivery  Assessment and plan of treatment:	- Please call your provider to schedule a postpartum visit in 2 weeks.  - Prescriptions have been sent to pharmacy.   - Continue with regular diet and activity as tolerated, nothing per vagina for 6 weeks, and exclusive breast feeding for the first 6 months is recommended.   - If you have additional concerns, or if you experience fevers > 100.4 F or heavy vaginal bleeding that is not decreasing, please inform your provider.   1

## 2022-08-24 NOTE — DISCHARGE NOTE OB - HOSPITAL COURSE
Closed fracture of left hip, initial encounter Patient is a 26yo  delivered @ 40.6wga via spontaneous vaginal delivery after IOL for post dates. Delivery was complicated by lower uterine segment atony for which she received uterotonics and was started on PO iron. She was transferred from L&D to postpartum unit without complications during her stay. Upon discharge she is voiding, tolerating PO, ambulating, lochia is decreasing, labs and vitals are stable, and pain is well controlled.

## 2022-08-24 NOTE — OB PROVIDER LABOR PROGRESS NOTE - ASSESSMENT
Cat 2 tracing with reassuring variability  IUPC placed for amnioinfusion   Continue with resuscitative measures  Cat 2 tracing with reassuring variability  IUPC placed for amnioinfusion   Continue with resuscitative measures     attending addendum  agree with above  maternal/fetal status reassuring  ppalos

## 2022-08-24 NOTE — OB RN DELIVERY SUMMARY - NS_AFTERADMROM_OBGYN_ALL_OB_DT
"Progress Note  Psychiatry    Admit Date: 1/24/2018   LOS: 8 days   Patient care and case discussed with nursing/staff.  Chart reviewed.    SUBJECTIVE:     Follow-up For:  Generalized Anxiety Disorder    Interval History:      Patient was seen after initial interview. Conversation was started on greater clarity for the Dysfunctional Thought Record assignment offered the day before. Pt was enthused for a formal structure to use in her journaling.    Made several copies of the record and used the most recent anxious moment as an example; the recent outburst when the  asked about residential living as an option after discharge.     Worked through the initial situation, automatic thoughts, emotions and delved into possible alternative thoughts and outcomes that would arise. Patient was aware of possible distortions of magnification, catastrophizing, and mind reading and emotional reasoning.     Asked about her perceptions of the psychiatry team and the , pt indicated that she has a much greater trust with the physicians and resident and it was "inappropriate for the  to have provided that treatment option instead of Dr Bellamy.".     Completed the thought record without much explicit details as patient complained of nausea and could not focus today for much deep introspection.     Concluded the interview with words of encouragement and offered that the [facility] is a short term option and the focus of the medical staff is to provide her the resources and tools to allow the patient the ability at stability in her life after discharge.     PRN Meds:acetaminophen, artificial tears, dicyclomine, guaifenesin 100 mg/5 ml, hydrOXYzine pamoate, ondansetron, phenyleph-shark mariposa oil-mo-pet, QUEtiapine, senna, simethicone, sodium chloride, white petrolatum-mineral oil     Psychotherapeutics     Start     Stop Route Frequency Ordered    01/25/18 1115  QUEtiapine tablet 25 mg      -- Oral Daily " "01/25/18 1008    01/25/18 0900  escitalopram oxalate tablet 20 mg      -- Oral Daily 01/24/18 2111 01/24/18 2115  QUEtiapine tablet 100 mg      -- Oral Nightly 01/24/18 2111 01/24/18 2115  traZODone tablet 150 mg      -- Oral Nightly 01/24/18 2111 01/24/18 2111  QUEtiapine tablet 25 mg      -- Oral 2 times daily PRN 01/24/18 2111          Review of patient's allergies indicates:   Allergen Reactions    Corticosteroids (glucocorticoids) Itching and Anxiety     Severe anxiety (temporary near psychosis as recently as 4/15)       Psychiatric ROS:   See Dr. Adriano Bellamy MD's Admission Note of date 1/24/18 for ROS.  Constitutional: no change in weight and appetite, no fatigue  Respiratory: no cough or shortness of breath  Gastrointestinal: positive for nausea        OBJECTIVE:     Vital Signs (Most Recent)  Temp: 99.2 °F (37.3 °C) (01/31/18 1915)  Pulse: 72 (01/31/18 1915)  Resp: 16 (01/31/18 1915)  BP: 124/65 (01/31/18 1915)  Weight: 78.5 kg (173 lb 1 oz) (01/24/18 2100)  BMI (Calculated): 28 (01/24/18 2100)      MUSCULOSKELETAL  Muscle Strength and Tone: normal tone and strength  Abnormal Involuntary Movements: myoclonus jerk reported, was not found on examination. AIMS score: 2  Gait and Station: normal gait    PSYCHIATRIC   Mental Status Exam  Level of Consciousness: alert  Orientation: oriented to person, place, situation  Grooming: moderate grooming, cleanly dressed   Psychomotor Behavior: no psychomotor retardation or agitation  Speech: normal rate, rhythm, tone, No poverty of speech  Language: Fluent in English  Mood: anxious, "tired and nauseous"   Affect: congruent with mood, appropriate, constricted, depressed  Thought Process: linear, logical, goal-oriented  Associations: intact  Thought Content:  No SI/HI, Feelings of hopelessness and fear. No hallucinations  Memory: memory intact; 3/3 immediate, 3/3 at 5 minutes. Named 4/4 past presidents  Attention: not redirectable. Attentive  Fund of " Knowledge: Appropriate for age/education. w-o-r-l-d; d-l-r-o-w   Abstract Reasoning: Intact  Insight: Patient is aware of their situation and the need to be hospitalized for care  Judgment: intact judgment,     Laboratory:  No results found for this or any previous visit (from the past 24 hour(s)).      ASSESSMENT/PLAN:     Need for continued hospitalization (from psychiatric standpoint):  continue inpatient psychiatric hospitalization for further stabilization    Continue current medical regimen for depression/anxiety:  Lexapro 20mg qd  Seroquel 125mg qd + 25mg PRN BID  Trazodone 150mg qhs  Vistaril 75mg PRN Q6h    Continue current medical regimen for chronic pain:  Baclofen 10mg BID  Gabapentin 800mg TID  Taper Fentanyl to 25mcg/day for 2/1/2018 and reduce in 72 hours    Cognitive Behavioral Therapy    Target Disposition:  Residential rehab living    Complexity Level:  Low             24-Aug-2022 11:43

## 2022-08-24 NOTE — OB PROVIDER LABOR PROGRESS NOTE - NS_OBIHIFHRDETAILS_OBGYN_ALL_OB_FT
Baseline 120, moderate variability, +accelerations, -decelerations
120 bpm, moderate variability + accels no decels
110 baseline, moderate variability, +accels, no decels
125 bpm, moderate variability + accels no decels present
baseline 125, moderate variability, +accels, -decels
110 baseline, moderate variability, +accels, variable decels
baseline 120, moderate variability, +accels, -decels

## 2022-08-24 NOTE — DISCHARGE NOTE OB - CARE PROVIDER_API CALL
Gloria Batista)  Obstetrics and Gynecology  [address] [place] [address]  Phone: [phone]  Fax: [phone]  Established Patient  Follow Up Time: 2 weeks

## 2022-08-24 NOTE — OB PROVIDER DELIVERY SUMMARY - NSPROVIDERDELIVERYNOTE_OBGYN_ALL_OB_FT
at 2243 of a live Male , 2950 gm and Apgars 9/9. Delivered JENNY  , loose nuchal cord x1 , clear fluid . Infant's head delivered with maternal expulsive efforts. Vertex delivered without difficulty. Shoulders then delivered without difficulty followed by the rest of the body. Nose and mouth were bulb suctioned. Cord clamped and cut after delay. Placenta delivered spontaneously at 2247, intact. Fundus firm on bimanual massage with KLAUS atony. Methergine 0.2mcg IV x1 and cytotec 1000mg rectal given prophylactically. Perineum, cervix and vagina were inspected and b/l labial tears were noted and repaired with 3.0 vicryl on SH.   cc. Hemostasis noted. Patient stable and recovering in L&D.  at 2243 of a live Male , 2950 gm and Apgars 9/9. Delivered JENNY  , loose nuchal cord x1 , clear fluid. Infant's head delivered with maternal expulsive efforts. Vertex delivered without difficulty. Shoulders then delivered without difficulty followed by the rest of the body. Nose and mouth were bulb suctioned. Cord clamped and cut after delay. Placenta delivered spontaneously at 2247, intact. Fundus firm on bimanual massage with KLAUS atony. Methergine 0.2mg IM x1 and cytotec 1000mg rectal given prophylactically. Perineum, cervix and vagina were inspected and b/l labial tears were noted and repaired with 3.0 vicryl on SH.   cc. Hemostasis noted. Patient stable and recovering in L&D.  at 2243 of a live Male , 2950 gm and Apgars 9/9. Delivered JENNY  , loose nuchal cord x1 , clear fluid. Infant's head delivered with maternal expulsive efforts. Vertex delivered without difficulty. Shoulders then delivered without difficulty followed by the rest of the body. Nose and mouth were bulb suctioned. Cord clamped and cut after delay. Placenta delivered spontaneously at 2247, intact. Fundus firm on bimanual massage with KLAUS atony. Methergine 0.2mg IM x1 and cytotec 1000mg rectal given prophylactically. Perineum, cervix and vagina were inspected and b/l labial tears were noted and repaired with 3.0 vicryl on SH.   cc. Hemostasis noted. Patient stable and recovering in L&D.    I was present throughout entire procedure  ppalos

## 2022-08-24 NOTE — OB PROVIDER LABOR PROGRESS NOTE - ASSESSMENT
Plan:  - VSS  - Reactive tracing  - C/w Pitocin augmentation (currently @ 4mu)  - Will reassess for cervical change as clinically indicated  - Continuous FHT/[place]  - Maternal/fetal status reassuring      Plan d/w Dr. Batista Plan:  - VSS  - Reactive tracing  - C/w Pitocin augmentation (currently @ 4mu)  - Will reassess for cervical change as clinically indicated  - Continuous FHT/[place]  - Maternal/fetal status reassuring      Plan d/w Dr. Batista    attending addendum  agree with above  maternal/fetal status reassuring  reassess in 1hr or prn  ppalos

## 2022-08-24 NOTE — DISCHARGE NOTE OB - PLAN OF CARE
- Please call your provider to schedule a postpartum visit in 2 weeks.  - Prescriptions have been sent to pharmacy.   - Continue with regular diet and activity as tolerated, nothing per vagina for 6 weeks, and exclusive breast feeding for the first 6 months is recommended.   - If you have additional concerns, or if you experience fevers > 100.4 F or heavy vaginal bleeding that is not decreasing, please inform your provider.

## 2022-08-24 NOTE — OB RN DELIVERY SUMMARY - NS_SEPSISRSKCALC_OBGYN_ALL_OB_FT
EOS calculated successfully. EOS Risk Factor: 0.5/1000 live births ([facility] national incidence); GA=40w6d; Temp=98.8; ROM=11; GBS='Negative'; Antibiotics='No antibiotics or any antibiotics < 2 hrs prior to birth'

## 2022-08-24 NOTE — DISCHARGE NOTE OB - MEDICATION SUMMARY - MEDICATIONS TO TAKE
I will START or STAY ON the medications listed below when I get home from the hospital:    ibuprofen 600 mg oral tablet  -- 1 tab(s) by mouth every 6 hours, As Needed -for mild pain   -- Do not take this drug if you are pregnant.  It is very important that you take or use this exactly as directed.  Do not skip doses or discontinue unless directed by your doctor.  May cause drowsiness or dizziness.  Obtain medical advice before taking any non-prescription drugs as some may affect the action of this medication.  Take with food or milk.    -- Indication: For Pain    Tylenol 325 mg oral capsule  -- 3 cap(s) by mouth every 6 hours, As Needed -for mild pain   -- Indication: For Pain    Prenatal Multivitamins with Folic Acid 1 mg oral tablet  -- 1 tab(s) by mouth once a day  -- Indication: For Prenatal    ferrous sulfate 325 mg (65 mg elemental iron) oral tablet  -- 1 tab(s) by mouth once a day   -- Check with your doctor before becoming pregnant.  Do not chew, break, or crush.  May discolor urine or feces.    -- Indication: For Iron    MiraLax oral powder for reconstitution  -- 17 gram(s) by mouth once a day   -- Dilute this medication with liquid before administration.  It is very important that you take or use this exactly as directed.  Do not skip doses or discontinue unless directed by your doctor.    -- Indication: For COnstipation

## 2022-08-24 NOTE — OB RN DELIVERY SUMMARY - NSSELHIDDEN_OBGYN_ALL_OB_FT
[NS_DeliveryAssist1_OBGYN_ALL_OB_FT:[id number]],[NS_DeliveryRN_OBGYN_ALL_OB_FT:[id number]] [NS_DeliveryAssist1_OBGYN_ALL_OB_FT:[id number]],[NS_DeliveryRN_OBGYN_ALL_OB_FT:[id number]],[NS_DeliveryAttending1_OBGYN_ALL_OB_FT:[id number]=]

## 2022-08-24 NOTE — OB PROVIDER LABOR PROGRESS NOTE - ASSESSMENT
FSE placed- not tracing   Exam unchanged   Pt more comfortable with epi top off   Continue with external monitor.   FHT reassuring  FSE placed- not tracing   Exam unchanged   Pt more comfortable with epi top off   Continue with external monitor.   FHT reassuring     attending addendum  agree with above  maternal/fetal status reassuring  will increase pitocin as tolerated  ppalos

## 2022-08-24 NOTE — DISCHARGE NOTE OB - PATIENT PORTAL LINK FT
You can access the FollowMyHealth Patient Portal offered by [facility] by registering at the following website: [email or web address]. By joining [employer]’s FollowMyHealth portal, you will also be able to view your health information using other applications (apps) compatible with our system.

## 2022-08-24 NOTE — OB PROVIDER DELIVERY SUMMARY - OB VTE ASSESSMENT
Pain management referral placed with German Gregg PA-C. Referral status OPEN, sent to [facility] to initiate approval status. <<--- Click to launch

## 2022-08-24 NOTE — OB PROVIDER LABOR PROGRESS NOTE - ASSESSMENT
Cat I tracing   Will continue to monitor  Cat I tracing   Will continue to monitor     attending addendum  Late entry from 930am  Patient reports pressure with contractions, no pain  Pitocin 8mu  VE: 3/60-2  Not in labor yet, will reassess in 2hrs or prn  materna/fetal status reassuring  ppalos

## 2022-08-24 NOTE — CHART NOTE - NSCHARTNOTEFT_GEN_A_CORE
Attending progress note    S: Reports pressure has improved, otherwise comfortable with epidural    O: VSS Per mat flow sheet    FHT: Cat 2 for non recurrent variable decels, +accels, moderate variability  [place]; every 3-4min    VE: 5/90/-2    A/P: Essential primip with IUP @ 41wks IOL for PD now in labor. GBSneg. Maternal/fetal status reassuring    -continue current mgmt  -decreased pitocin now that AROM  -if variables continue will start amnioinfusion  -reassess tracing in 30min or prn  ppalos

## 2022-08-24 NOTE — OB PROVIDER LABOR PROGRESS NOTE - ASSESSMENT
Cat 1 tracing   Pt to receive epidural bolus    anticipated  Cat 1 tracing   Pt to receive epidural bolus    anticipated     attending addendum  A/P: Essential primip with IUP @ 41wks IOL for PD now in labor. GBSneg. Maternal/fetal status reassuring    -continue current mgmt  -reassess in 2hrs or prn   ppalos.

## 2022-08-25 LAB
ABO RH CONFIRMATION: SIGNIFICANT CHANGE UP
ALLERGY+IMMUNOLOGY DIAG STUDY NOTE: SIGNIFICANT CHANGE UP
FETAL SCREEN: (no result)
HCT VFR BLD CALC: 30.9 % — LOW (ref 34.5–45)
HGB BLD-MCNC: 10.1 G/DL — LOW (ref 11.5–15.5)
KLEIHAUER-BETKE CALCULATION: 0.1 % — SIGNIFICANT CHANGE UP (ref 0–0.3)

## 2022-08-25 PROCEDURE — 87800 DETECT AGNT MULT DNA DIREC: CPT

## 2022-08-25 PROCEDURE — 36415 COLL VENOUS BLD VENIPUNCTURE: CPT

## 2022-08-25 PROCEDURE — 87591 N.GONORRHOEAE DNA AMP PROB: CPT

## 2022-08-25 PROCEDURE — 86880 COOMBS TEST DIRECT: CPT

## 2022-08-25 PROCEDURE — 85610 PROTHROMBIN TIME: CPT

## 2022-08-25 PROCEDURE — 86870 RBC ANTIBODY IDENTIFICATION: CPT

## 2022-08-25 PROCEDURE — G0463: CPT

## 2022-08-25 PROCEDURE — 85730 THROMBOPLASTIN TIME PARTIAL: CPT

## 2022-08-25 PROCEDURE — 86850 RBC ANTIBODY SCREEN: CPT

## 2022-08-25 PROCEDURE — 81001 URINALYSIS AUTO W/SCOPE: CPT

## 2022-08-25 PROCEDURE — 76815 OB US LIMITED FETUS(S): CPT

## 2022-08-25 PROCEDURE — 85025 COMPLETE CBC W/AUTO DIFF WBC: CPT

## 2022-08-25 PROCEDURE — 86900 BLOOD TYPING SEROLOGIC ABO: CPT

## 2022-08-25 PROCEDURE — 85384 FIBRINOGEN ACTIVITY: CPT

## 2022-08-25 PROCEDURE — 87491 CHLMYD TRACH DNA AMP PROBE: CPT

## 2022-08-25 PROCEDURE — 86901 BLOOD TYPING SEROLOGIC RH(D): CPT

## 2022-08-25 PROCEDURE — 59025 FETAL NON-STRESS TEST: CPT

## 2022-08-25 RX ORDER — IBUPROFEN 200 MG
600 TABLET ORAL EVERY 6 HOURS
Refills: 0 | Status: DISCONTINUED | OUTPATIENT
Start: 2022-08-25 | End: 2022-08-26

## 2022-08-25 RX ADMIN — Medication 325 MILLIGRAM(S): at 12:51

## 2022-08-25 RX ADMIN — Medication 600 MILLIGRAM(S): at 17:01

## 2022-08-25 RX ADMIN — Medication 600 MILLIGRAM(S): at 23:41

## 2022-08-25 RX ADMIN — SODIUM CHLORIDE 3 MILLILITER(S): 9 INJECTION INTRAMUSCULAR; INTRAVENOUS; SUBCUTANEOUS at 05:04

## 2022-08-25 RX ADMIN — Medication 600 MILLIGRAM(S): at 05:01

## 2022-08-25 RX ADMIN — Medication 975 MILLIGRAM(S): at 15:13

## 2022-08-25 RX ADMIN — Medication 975 MILLIGRAM(S): at 21:07

## 2022-08-25 RX ADMIN — SODIUM CHLORIDE 3 MILLILITER(S): 9 INJECTION INTRAMUSCULAR; INTRAVENOUS; SUBCUTANEOUS at 15:01

## 2022-08-25 RX ADMIN — Medication 975 MILLIGRAM(S): at 08:55

## 2022-08-25 RX ADMIN — Medication 600 MILLIGRAM(S): at 12:51

## 2022-08-25 RX ADMIN — Medication 1 TABLET(S): at 12:51

## 2022-08-25 NOTE — PROGRESS NOTE ADULT - ASSESSMENT
A/P:  PADMINI JAIMES is a 27y  now PPD#1 s/p spontaneous vaginal delivery at 41w1d gestation, c/b b/l labial laceration, lower uterine segment atony (s/p Methergine and Cytotec [place]) who is currently doing well.   - Vital signs stable  - Hgb: 10.3 -> AM labs pending   - Patient is O- (s/p Rhogam on ), will receive Rhogam prior to discharge    - Voiding, tolerating PO, bowel function nml   -Advance care as tolerated   -Continue routine postpartum care and education  -Healthy male infant, desires circumcision  -Dispo: Patient to be discharged tomorrow when meeting all postpartum and postoperative milestones and pending attending approval. A/P:  PADMINI JAIMES is a 27y  now PPD#1 s/p spontaneous vaginal delivery at 41w1d gestation, c/b b/l labial laceration, lower uterine segment atony (s/p Methergine and Cytotec [place]) who is currently doing well.   - Vital signs stable  - Hgb: 10.3 -> AM labs pending   - Patient is O- (s/p Rhogam on ), will receive Rhogam prior to discharge  - SW consult prior to discharge     - Voiding, tolerating PO, bowel function nml   -Advance care as tolerated   -Continue routine postpartum care and education  -Healthy male infant, desires circumcision  -Dispo: Patient to be discharged tomorrow when meeting all postpartum and postoperative milestones and pending attending approval.

## 2022-08-26 VITALS
RESPIRATION RATE: 18 BRPM | SYSTOLIC BLOOD PRESSURE: 100 MMHG | DIASTOLIC BLOOD PRESSURE: 73 MMHG | HEART RATE: 62 BPM | TEMPERATURE: 98 F | OXYGEN SATURATION: 97 %

## 2022-08-26 DIAGNOSIS — D62 ACUTE POSTHEMORRHAGIC ANEMIA: ICD-10-CM

## 2022-08-26 DIAGNOSIS — O26.899 OTHER SPECIFIED PREGNANCY RELATED CONDITIONS, UNSPECIFIED TRIMESTER: ICD-10-CM

## 2022-08-26 PROCEDURE — 85018 HEMOGLOBIN: CPT

## 2022-08-26 PROCEDURE — 86901 BLOOD TYPING SEROLOGIC RH(D): CPT

## 2022-08-26 PROCEDURE — 85460 HEMOGLOBIN FETAL: CPT

## 2022-08-26 PROCEDURE — 85461 HEMOGLOBIN FETAL: CPT

## 2022-08-26 PROCEDURE — 86900 BLOOD TYPING SEROLOGIC ABO: CPT

## 2022-08-26 PROCEDURE — 85025 COMPLETE CBC W/AUTO DIFF WBC: CPT

## 2022-08-26 PROCEDURE — 80307 DRUG TEST PRSMV CHEM ANLYZR: CPT

## 2022-08-26 PROCEDURE — 86765 RUBEOLA ANTIBODY: CPT

## 2022-08-26 PROCEDURE — 86769 SARS-COV-2 COVID-19 ANTIBODY: CPT

## 2022-08-26 PROCEDURE — 86850 RBC ANTIBODY SCREEN: CPT

## 2022-08-26 PROCEDURE — 85014 HEMATOCRIT: CPT

## 2022-08-26 PROCEDURE — 86780 TREPONEMA PALLIDUM: CPT

## 2022-08-26 PROCEDURE — 36415 COLL VENOUS BLD VENIPUNCTURE: CPT

## 2022-08-26 RX ADMIN — Medication 975 MILLIGRAM(S): at 16:05

## 2022-08-26 RX ADMIN — Medication 975 MILLIGRAM(S): at 10:20

## 2022-08-26 RX ADMIN — Medication 600 MILLIGRAM(S): at 12:19

## 2022-08-26 RX ADMIN — Medication 325 MILLIGRAM(S): at 12:19

## 2022-08-26 RX ADMIN — Medication 600 MILLIGRAM(S): at 05:11

## 2022-08-26 RX ADMIN — Medication 1 TABLET(S): at 12:20

## 2022-08-26 NOTE — PROGRESS NOTE ADULT - SUBJECTIVE AND OBJECTIVE BOX
PADMINI JAIMES is a 27y  now PPD#2 s/p spontaneous vaginal delivery at 41w1d gestation, c/b b/l labial laceration, lower uterine segment atony (s/p methergine and cytotec [place]).    S:    No acute events overnight.   The patient has no complaints.   Pain controlled with current treatment regimen.   She is ambulating without difficulty and tolerating PO.   + flatus/-BM/+ voiding   She endorses appropriate lochia, which is decreasing. Denies passage of clots.   She is breastfeeding without difficulty.   She denies fevers, chills, nausea and vomiting.   She denies lightheadedness, dizziness, palpitations, chest pain and SOB.     Pt states she feels ready to go home today.     O:    Vital Signs Last 24 Hrs  T(C): 36.7 (25 Aug 2022 15:57), Max: 36.7 (25 Aug 2022 15:57)  T(F): 98.1 (25 Aug 2022 15:57), Max: 98.1 (25 Aug 2022 15:57)  HR: 70 (25 Aug 2022 15:57) (70 - 70)  BP: 95/60 (25 Aug 2022 15:57) (95/60 - 95/60)  RR: 18 (25 Aug 2022 15:57) (18 - 18)  SpO2: 98% (25 Aug 2022 15:57) (98% - 98%)    Gen: NAD, AOx3  CV: RRR, S1/S2 present  Pulm: CTAB  Abdomen:  Soft, non-tender, non-distended, +bowel sounds  Uterus:  Fundus firm below umbilicus  VE:  Expected lochia  Ext:  Non-tender and non-edematous                   10.1   x     )-----------( x        ( 25 Aug 2022 05:08 )             30.9     
PADMINI JAIMES is a 27y  now PPD#1 s/p spontaneous vaginal delivery at 41w1d gestation, c/b b/l labial laceration, lower uterine segment atony (s/p methergine and cytotec [place]).    S:    No acute events overnight.   The patient has no complaints.   Pain controlled with current treatment regimen.   She is ambulating without difficulty and tolerating PO.   + flatus/-BM/+ voiding   She endorses appropriate lochia, which is decreasing. Denies passage of clots.   She is breastfeeding without difficulty.   She denies fevers, chills, nausea and vomiting.   She denies lightheadedness, dizziness, palpitations, chest pain and SOB.     O:    T(C): 36.8 (22 @ 05:08), Max: 37.1 (22 @ 07:38)  HR: 66 (22 @ 05:08) (55 - 107)  BP: 98/59 (22 @ 05:08) (79/52 - 135/67)  RR: 18 (22 @ 05:08) (16 - 19)  SpO2: 96% (22 @ 05:08) (91% - 97%)    Gen: NAD, AOx3  CV: RRR, S1/S2 present  Pulm: CTAB  Abdomen:  Soft, non-tender, non-distended, +bowel sounds  Uterus:  Fundus firm below umbilicus  VE:  Expected lochia  Ext:  Non-tender and non-edematous                          10.3   6.45  )-----------( 226      ( 23 Aug 2022 10:00 )             31.4

## 2022-08-26 NOTE — PROGRESS NOTE ADULT - ATTENDING COMMENTS
PPD 2  complicated by hemorrhage, pt asymptomatic .   mild anemia , will take PO iron   Rh neg, fetal screen +., s/p Rhogam   no complaints  tolerating PO  reports lochia as mild   breast feeding   voiding well  abd soft, uterus firm NT  ext no edema   breast N  plan   DC home   PP precautions given
27y  now stable PPD#1 s/p spontaneous vaginal delivery, atony has resolved.

## 2022-08-31 DIAGNOSIS — Z3A.40 40 WEEKS GESTATION OF PREGNANCY: ICD-10-CM

## 2022-08-31 DIAGNOSIS — O26.893 OTHER SPECIFIED PREGNANCY RELATED CONDITIONS, THIRD TRIMESTER: ICD-10-CM

## 2022-08-31 DIAGNOSIS — Z91.040 LATEX ALLERGY STATUS: ICD-10-CM

## 2022-08-31 DIAGNOSIS — O48.0 POST-TERM PREGNANCY: ICD-10-CM

## 2022-08-31 DIAGNOSIS — Z67.41 TYPE O BLOOD, RH NEGATIVE: ICD-10-CM

## 2022-08-31 DIAGNOSIS — D62 ACUTE POSTHEMORRHAGIC ANEMIA: ICD-10-CM

## 2022-08-31 DIAGNOSIS — F31.9 BIPOLAR DISORDER, UNSPECIFIED: ICD-10-CM

## 2022-11-07 NOTE — OB PROVIDER IHI INDUCTION/AUGMENTATION NOTE - NS_OBIHITYPE_OBGYN_ALL_OB
Chief Complaint   Patient presents with     Blood Draw     Port blood draw with heparin flush by lab RN.     Shannon Walker RN  
Induction
Induction

## 2023-02-07 NOTE — OB RN TRIAGE NOTE - NS PRO ABUSE SCREEN AFRAID ANYONE YN
From: Skyler Mae  To: Dr. Karlene Kuhn: 2/3/2023 3:39 PM CST  Subject: How much of a H49 do I give Bette Mclain was prescribed sulfamethoxazale-tmpds tablet today by Janee Bryant for a UTI and I have a question for her. The pill she should us on the computer was a small round pill and the pharmacy gave us a big oval pill labeled H49, is the whole pill to strong? I gave her half of one already. no

## 2023-03-13 ENCOUNTER — EMERGENCY (EMERGENCY)
Facility: HOSPITAL | Age: 28
LOS: 1 days | Discharge: HOME | End: 2023-03-13
Attending: STUDENT IN AN ORGANIZED HEALTH CARE EDUCATION/TRAINING PROGRAM
Payer: MEDICAID

## 2023-03-13 VITALS
OXYGEN SATURATION: 98 % | HEART RATE: 70 BPM | RESPIRATION RATE: 18 BRPM | HEIGHT: 67 IN | TEMPERATURE: 98 F | SYSTOLIC BLOOD PRESSURE: 99 MMHG | DIASTOLIC BLOOD PRESSURE: 66 MMHG | WEIGHT: 143.3 LBS

## 2023-03-13 VITALS
HEART RATE: 68 BPM | TEMPERATURE: 98 F | SYSTOLIC BLOOD PRESSURE: 104 MMHG | RESPIRATION RATE: 16 BRPM | OXYGEN SATURATION: 97 % | DIASTOLIC BLOOD PRESSURE: 72 MMHG

## 2023-03-13 DIAGNOSIS — N60.01 SOLITARY CYST OF RIGHT BREAST: Chronic | ICD-10-CM

## 2023-03-13 PROBLEM — U07.1 COVID-19: Chronic | Status: ACTIVE | Noted: 2022-08-24

## 2023-03-13 PROBLEM — Z33.2 ENCOUNTER FOR ELECTIVE TERMINATION OF PREGNANCY: Chronic | Status: ACTIVE | Noted: 2022-08-24

## 2023-03-13 LAB
ALBUMIN SERPL ELPH-MCNC: 4.7 G/DL — SIGNIFICANT CHANGE UP (ref 3.3–5.2)
ALP SERPL-CCNC: 105 U/L — SIGNIFICANT CHANGE UP (ref 40–120)
ALT FLD-CCNC: 6 U/L — SIGNIFICANT CHANGE UP
ANION GAP SERPL CALC-SCNC: 9 MMOL/L — SIGNIFICANT CHANGE UP (ref 5–17)
APPEARANCE UR: CLEAR — SIGNIFICANT CHANGE UP
APTT BLD: 35.2 SEC — SIGNIFICANT CHANGE UP (ref 27.5–35.5)
AST SERPL-CCNC: 13 U/L — SIGNIFICANT CHANGE UP
BACTERIA # UR AUTO: ABNORMAL
BASOPHILS # BLD AUTO: 0.01 K/UL — SIGNIFICANT CHANGE UP (ref 0–0.2)
BASOPHILS NFR BLD AUTO: 0.2 % — SIGNIFICANT CHANGE UP (ref 0–2)
BILIRUB SERPL-MCNC: 0.7 MG/DL — SIGNIFICANT CHANGE UP (ref 0.4–2)
BILIRUB UR-MCNC: NEGATIVE — SIGNIFICANT CHANGE UP
BLD GP AB SCN SERPL QL: SIGNIFICANT CHANGE UP
BUN SERPL-MCNC: 8.1 MG/DL — SIGNIFICANT CHANGE UP (ref 8–20)
CALCIUM SERPL-MCNC: 9.4 MG/DL — SIGNIFICANT CHANGE UP (ref 8.4–10.5)
CHLORIDE SERPL-SCNC: 101 MMOL/L — SIGNIFICANT CHANGE UP (ref 96–108)
CO2 SERPL-SCNC: 30 MMOL/L — HIGH (ref 22–29)
COLOR SPEC: YELLOW — SIGNIFICANT CHANGE UP
CREAT SERPL-MCNC: 0.56 MG/DL — SIGNIFICANT CHANGE UP (ref 0.5–1.3)
DIFF PNL FLD: ABNORMAL
EGFR: 128 ML/MIN/1.73M2 — SIGNIFICANT CHANGE UP
EOSINOPHIL # BLD AUTO: 0.04 K/UL — SIGNIFICANT CHANGE UP (ref 0–0.5)
EOSINOPHIL NFR BLD AUTO: 0.9 % — SIGNIFICANT CHANGE UP (ref 0–6)
EPI CELLS # UR: SIGNIFICANT CHANGE UP
GLUCOSE SERPL-MCNC: 75 MG/DL — SIGNIFICANT CHANGE UP (ref 70–99)
GLUCOSE UR QL: NEGATIVE MG/DL — SIGNIFICANT CHANGE UP
HCG SERPL-ACNC: 35.1 MIU/ML — HIGH
HCT VFR BLD CALC: 35.2 % — SIGNIFICANT CHANGE UP (ref 34.5–45)
HCT VFR BLD CALC: 38.3 % — SIGNIFICANT CHANGE UP (ref 34.5–45)
HGB BLD-MCNC: 11.5 G/DL — SIGNIFICANT CHANGE UP (ref 11.5–15.5)
HGB BLD-MCNC: 12.4 G/DL — SIGNIFICANT CHANGE UP (ref 11.5–15.5)
IMM GRANULOCYTES NFR BLD AUTO: 0.2 % — SIGNIFICANT CHANGE UP (ref 0–0.9)
INR BLD: 1.2 RATIO — HIGH (ref 0.88–1.16)
KETONES UR-MCNC: NEGATIVE — SIGNIFICANT CHANGE UP
LEUKOCYTE ESTERASE UR-ACNC: ABNORMAL
LYMPHOCYTES # BLD AUTO: 2.33 K/UL — SIGNIFICANT CHANGE UP (ref 1–3.3)
LYMPHOCYTES # BLD AUTO: 53.8 % — HIGH (ref 13–44)
MCHC RBC-ENTMCNC: 28.4 PG — SIGNIFICANT CHANGE UP (ref 27–34)
MCHC RBC-ENTMCNC: 28.5 PG — SIGNIFICANT CHANGE UP (ref 27–34)
MCHC RBC-ENTMCNC: 32.4 GM/DL — SIGNIFICANT CHANGE UP (ref 32–36)
MCHC RBC-ENTMCNC: 32.7 GM/DL — SIGNIFICANT CHANGE UP (ref 32–36)
MCV RBC AUTO: 87.1 FL — SIGNIFICANT CHANGE UP (ref 80–100)
MCV RBC AUTO: 87.6 FL — SIGNIFICANT CHANGE UP (ref 80–100)
MONOCYTES # BLD AUTO: 0.33 K/UL — SIGNIFICANT CHANGE UP (ref 0–0.9)
MONOCYTES NFR BLD AUTO: 7.6 % — SIGNIFICANT CHANGE UP (ref 2–14)
NEUTROPHILS # BLD AUTO: 1.61 K/UL — LOW (ref 1.8–7.4)
NEUTROPHILS NFR BLD AUTO: 37.3 % — LOW (ref 43–77)
NITRITE UR-MCNC: NEGATIVE — SIGNIFICANT CHANGE UP
PH UR: 7 — SIGNIFICANT CHANGE UP (ref 5–8)
PLATELET # BLD AUTO: 299 K/UL — SIGNIFICANT CHANGE UP (ref 150–400)
PLATELET # BLD AUTO: 325 K/UL — SIGNIFICANT CHANGE UP (ref 150–400)
POTASSIUM SERPL-MCNC: 3.7 MMOL/L — SIGNIFICANT CHANGE UP (ref 3.5–5.3)
POTASSIUM SERPL-SCNC: 3.7 MMOL/L — SIGNIFICANT CHANGE UP (ref 3.5–5.3)
PROT SERPL-MCNC: 7.5 G/DL — SIGNIFICANT CHANGE UP (ref 6.6–8.7)
PROT UR-MCNC: 15
PROTHROM AB SERPL-ACNC: 14 SEC — HIGH (ref 10.5–13.4)
RBC # BLD: 4.04 M/UL — SIGNIFICANT CHANGE UP (ref 3.8–5.2)
RBC # BLD: 4.37 M/UL — SIGNIFICANT CHANGE UP (ref 3.8–5.2)
RBC # FLD: 13.1 % — SIGNIFICANT CHANGE UP (ref 10.3–14.5)
RBC # FLD: 13.2 % — SIGNIFICANT CHANGE UP (ref 10.3–14.5)
RBC CASTS # UR COMP ASSIST: ABNORMAL /HPF (ref 0–4)
SODIUM SERPL-SCNC: 140 MMOL/L — SIGNIFICANT CHANGE UP (ref 135–145)
SP GR SPEC: 1.01 — SIGNIFICANT CHANGE UP (ref 1.01–1.02)
UROBILINOGEN FLD QL: 1 MG/DL
WBC # BLD: 4.32 K/UL — SIGNIFICANT CHANGE UP (ref 3.8–10.5)
WBC # BLD: 4.33 K/UL — SIGNIFICANT CHANGE UP (ref 3.8–10.5)
WBC # FLD AUTO: 4.32 K/UL — SIGNIFICANT CHANGE UP (ref 3.8–10.5)
WBC # FLD AUTO: 4.33 K/UL — SIGNIFICANT CHANGE UP (ref 3.8–10.5)
WBC UR QL: SIGNIFICANT CHANGE UP /HPF (ref 0–5)

## 2023-03-13 PROCEDURE — 76830 TRANSVAGINAL US NON-OB: CPT

## 2023-03-13 PROCEDURE — 85025 COMPLETE CBC W/AUTO DIFF WBC: CPT

## 2023-03-13 PROCEDURE — 86901 BLOOD TYPING SEROLOGIC RH(D): CPT

## 2023-03-13 PROCEDURE — 85610 PROTHROMBIN TIME: CPT

## 2023-03-13 PROCEDURE — 76856 US EXAM PELVIC COMPLETE: CPT | Mod: 26

## 2023-03-13 PROCEDURE — 81001 URINALYSIS AUTO W/SCOPE: CPT

## 2023-03-13 PROCEDURE — 86850 RBC ANTIBODY SCREEN: CPT

## 2023-03-13 PROCEDURE — 36415 COLL VENOUS BLD VENIPUNCTURE: CPT

## 2023-03-13 PROCEDURE — 99284 EMERGENCY DEPT VISIT MOD MDM: CPT

## 2023-03-13 PROCEDURE — 76856 US EXAM PELVIC COMPLETE: CPT

## 2023-03-13 PROCEDURE — 86900 BLOOD TYPING SEROLOGIC ABO: CPT

## 2023-03-13 PROCEDURE — 85730 THROMBOPLASTIN TIME PARTIAL: CPT

## 2023-03-13 PROCEDURE — 84702 CHORIONIC GONADOTROPIN TEST: CPT

## 2023-03-13 PROCEDURE — 80053 COMPREHEN METABOLIC PANEL: CPT

## 2023-03-13 PROCEDURE — 87086 URINE CULTURE/COLONY COUNT: CPT

## 2023-03-13 PROCEDURE — 85027 COMPLETE CBC AUTOMATED: CPT

## 2023-03-13 PROCEDURE — 76830 TRANSVAGINAL US NON-OB: CPT | Mod: 26

## 2023-03-13 NOTE — ED ADULT TRIAGE NOTE - TEMPERATURE IN CELSIUS (DEGREES C)
36.8 Island Pedicle Flap Text: The defect edges were debeveled with a #15 scalpel blade.  Given the location of the defect, shape of the defect and the proximity to free margins an island pedicle advancement flap was deemed most appropriate.  Using a sterile surgical marker, an appropriate advancement flap was drawn incorporating the defect, outlining the appropriate donor tissue and placing the expected incisions within the relaxed skin tension lines where possible.    The area thus outlined was incised deep to adipose tissue with a #15 scalpel blade.  The skin margins were undermined to an appropriate distance in all directions around the primary defect and laterally outward around the island pedicle utilizing iris scissors.  There was minimal undermining beneath the pedicle flap.

## 2023-03-13 NOTE — ED STATDOCS - CLINICAL SUMMARY MEDICAL DECISION MAKING FREE TEXT BOX
28 y/o female with Hx and PE as above; deferred pelvic exam. no abdominal pain on exam, consider irregular menses post-, retaining products, reoccurring pregnancy. Obtain labs, urine and transvaginal US. 28 y/o female with Hx and PE as above; deferred pelvic exam at this time. Patient informed to let provider know if bleeding becomes heavier. no abdominal pain on exam, consider irregular menses post-, retained products,  pregnancy. Obtain labs, urine and transvaginal US. dispo and further interventions pending 26 y/o female with Hx and PE as above; deferred pelvic exam at this time. Patient informed to let provider know if bleeding becomes heavier. no abdominal pain on exam, consider irregular menses post-, retained products,  pregnancy. Obtain labs, urine and transvaginal US. dispo and further interventions pending    PA SMITh: no RPOC on sono + polyp, stable H/H vitals stable. pt requires rhogam and repeat hcg check in 2 days to make sure down trending

## 2023-03-13 NOTE — ED ADULT NURSE NOTE - OBJECTIVE STATEMENT
Assumed care of patient in pr-c. Pt a&ox4 rr even and unlabored presents to ed s/p misoprostol  on  with on live child. Pt reports bleeding and cramps stopped 3rd day after procedure. Pt reports memses started 3/3/23 normal, but resumed saturday 2 days ago. Pt reports feeling like normal menstruation but heavier. Pt reports using one pad every 4-5. Pt denies vision changes, fever, chills, numbness/tingling, discharge/odor. pt educated on plan of care, pt able to successfully teach back plan of care to RN, RN will continue to reeducate pt during hospital stay.

## 2023-03-13 NOTE — ED STATDOCS - PATIENT PORTAL LINK FT
You can access the FollowMyHealth Patient Portal offered by [facility] by registering at the following website: [email or web address]. By joining [facility]’s FollowMyHealth portal, you will also be able to view your health information using other applications (apps) compatible with our system.

## 2023-03-13 NOTE — ED STATDOCS - PHYSICAL EXAMINATION
PHYSICAL EXAM:   General: well-appearing, appears stated age, not in extremis   HEENT: NC/AT, PERRLA, conjunctiva pink, airway patent  Cardiovascular: regular rate and rhythm, + S1/S2, no murmurs, rubs, gallops appreciated  Respiratory: clear to auscultation bilaterally, good aeration bilaterally, nonlabored respirations  Abdominal: soft, nontender, nondistended, no rebound, guarding or rigidity  Back: no costovertebral tenderness, no rashes noted  Extremities: no LE edema b/l. Radial pulses equal and strong b/l  Neuro: Alert and oriented x3. Moving all extremities. Ambulatory.  Psychiatric: appropriate mood and affect.   Skin: appropriate color, warm, dry.   : Pt denied pelvic exam.   -Maru Kraft MD Attending Physician PHYSICAL EXAM:   General: well-appearing, appears stated age, not in extremis   HEENT: NC/AT, airway patent  Cardiovascular: regular rate and rhythm, + S1/S2, no murmurs, rubs, gallops appreciated  Respiratory: clear to auscultation bilaterally, good aeration bilaterally, nonlabored respirations  Abdominal: soft, nontender, nondistended, no rebound, guarding or rigidity  Back: no costovertebral tenderness,  Neuro: Alert and oriented x3. Moving all extremities.   Psychiatric: appropriate mood and affect.   : Pt declinied pelvic exam at this time  -Maru Kraft MD Attending Physician

## 2023-03-13 NOTE — ED STATDOCS - DIFFERENTIAL DIAGNOSIS
Irregular menses post-, retaining products, reoccurring pregnancy, Differential Diagnosis Irregular menses post-, retained products, pregnancy,

## 2023-03-13 NOTE — ED STATDOCS - OBJECTIVE STATEMENT
28 y/o female with no pertinent PMHx presents to ED s/p Misoprostol  on , with one other child. Pt states cramping and bleeding stopped by the third day. Her menses came 3/3, normal, then on Saturday (2 days ago) started again. Pt states she has no clots, seems like her normal menses but heavier. Pt bleeding through a pad around every 5 hours. No abnormal discharge or abnormal odor or other discharge. Pt smoker of marijuana, denies etoh use or smoking tobacco. Pt states she does not want a pelvic exam at this time. 26 y/o female with no pertinent PMHx presents to ED s/p Misoprostol  on , . Pt states cramping and bleeding stopped by the third day. Her menses came 3/3, was normal, then on Saturday (2 days ago) started again. Pt states she has no clots, seems like her normal menses. Pt bleeding through a pad around every 5 hours. No abnormal discharge or abnormal odor or other discharge. No fevers, Pt smoker of marijuana, denies etoh use or smoking tobacco or other drug use. Pt states she does not want a pelvic exam at this time.

## 2023-03-13 NOTE — ED STATDOCS - PROGRESS NOTE DETAILS
KATINA KENNY: PT evaluated by intake physician. HPI/PE/ROS as noted above. Will follow up plan per intake physician   recent , down trending HCG, unsure what last level was. incidentals of polyp discussed with pt and need for outpt fu with gyn and recheck hcg in 2 days,   pt also requiring rhogham, pt consented will give while in ED offered rhogam and consented for rhogam.   while waiting for rhogam prior to discharge pt maged and other child came, states that she will fu with gyn for rhogam and that she is aware that she needs it

## 2023-03-13 NOTE — ED STATDOCS - NSFOLLOWUPINSTRUCTIONS_ED_ALL_ED_FT
please return to the ED within 48 hours for repeat HCG test to make sure levels are down trending   please follow with GYN in regards to sono results   continue to monitor bleeding, new or worsening symptoms return to the ED, including but not limited too, heavier bleeding, lightheadedness, dizziness, chest pain, palpitations, syncope, fever, chills

## 2023-03-13 NOTE — ED STATDOCS - NS ED ROS FT
REVIEW OF SYSTEMS:  General:  no fever, no chills  HEENT: no headache, no vision changes  Cardiac: no chest pain, no palpitations  Respiratory: no cough, no shortness of breath  Gastrointestinal: no abdominal pain, no nausea, no vomiting, no diarrhea, no melena, no hematochezia   Genitourinary: no hematuria, no dysuria, no urinary frequency, no urinary hesitancy, +vaginal bleeding , no abnormal discharge  Extremities: no extremity swelling, no extremity pain  Neuro: no focal weakness, no numbness/tingling of the extremities, no decreased sensation  Heme: no easy bleeding, no easy bruising, no anemia  Skin: no abrasions, no jaundice, no pruritis, no rashes, no lesions  -Maru Kraft MD Attending Physician REVIEW OF SYSTEMS:  General:  no fever, no chills, +fatigue  HEENT: no headache, no vision changes  Cardiac: no chest pain, no palpitations  Respiratory: no cough, no shortness of breath  Gastrointestinal: no abdominal pain, + nausea, no vomiting,   Genitourinary: no hematuria, no dysuria, no urinary frequency, no urinary hesitancy, +vaginal bleeding , no abnormal discharge  -Maru Kraft MD Attending Physician

## 2023-03-13 NOTE — ED ADULT TRIAGE NOTE - CHIEF COMPLAINT QUOTE
pt states she had an  in February, heavy vaginal bleeding x 2.5 weeks  A&Ox3, resp wnl, denies cramping

## 2023-03-14 LAB
CULTURE RESULTS: SIGNIFICANT CHANGE UP
SPECIMEN SOURCE: SIGNIFICANT CHANGE UP

## 2023-12-28 ENCOUNTER — NON-APPOINTMENT (OUTPATIENT)
Age: 28
End: 2023-12-28

## 2024-07-30 ENCOUNTER — NON-APPOINTMENT (OUTPATIENT)
Age: 29
End: 2024-07-30

## 2024-08-12 ENCOUNTER — OFFICE (OUTPATIENT)
Dept: URBAN - METROPOLITAN AREA CLINIC 113 | Facility: CLINIC | Age: 29
Setting detail: OPHTHALMOLOGY
End: 2024-08-12
Payer: COMMERCIAL

## 2024-08-12 DIAGNOSIS — Z01.00: ICD-10-CM

## 2024-08-12 PROCEDURE — 92004 COMPRE OPH EXAM NEW PT 1/>: CPT | Performed by: OPTOMETRIST

## 2024-08-12 ASSESSMENT — CONFRONTATIONAL VISUAL FIELD TEST (CVF)
OD_FINDINGS: FULL
OS_FINDINGS: FULL